# Patient Record
Sex: FEMALE
[De-identification: names, ages, dates, MRNs, and addresses within clinical notes are randomized per-mention and may not be internally consistent; named-entity substitution may affect disease eponyms.]

---

## 2024-07-29 PROBLEM — Z00.00 ENCOUNTER FOR PREVENTIVE HEALTH EXAMINATION: Status: ACTIVE | Noted: 2024-07-29

## 2024-07-30 ENCOUNTER — APPOINTMENT (OUTPATIENT)
Dept: PULMONOLOGY | Facility: CLINIC | Age: 84
End: 2024-07-30
Payer: MEDICARE

## 2024-07-30 VITALS
WEIGHT: 167 LBS | SYSTOLIC BLOOD PRESSURE: 110 MMHG | HEART RATE: 89 BPM | DIASTOLIC BLOOD PRESSURE: 50 MMHG | BODY MASS INDEX: 25.91 KG/M2 | OXYGEN SATURATION: 97 % | HEIGHT: 67.5 IN

## 2024-07-30 DIAGNOSIS — J44.9 CHRONIC OBSTRUCTIVE PULMONARY DISEASE, UNSPECIFIED: ICD-10-CM

## 2024-07-30 PROCEDURE — 99204 OFFICE O/P NEW MOD 45 MIN: CPT

## 2024-07-30 RX ORDER — PANTOPRAZOLE 40 MG/1
40 TABLET, DELAYED RELEASE ORAL DAILY
Refills: 0 | Status: ACTIVE | COMMUNITY

## 2024-07-30 RX ORDER — METOLAZONE 5 MG/1
5 TABLET ORAL DAILY
Refills: 0 | Status: ACTIVE | COMMUNITY

## 2024-07-30 RX ORDER — CHLORHEXIDINE GLUCONATE 4 %
325 (65 FE) LIQUID (ML) TOPICAL DAILY
Refills: 0 | Status: ACTIVE | COMMUNITY

## 2024-07-30 RX ORDER — ACETAMINOPHEN/DIPHENHYDRAMINE 500MG-25MG
1000 TABLET ORAL DAILY
Refills: 0 | Status: ACTIVE | COMMUNITY

## 2024-07-30 RX ORDER — MULTIVIT-MIN/FOLIC/VIT K/LYCOP 400-300MCG
25 MCG TABLET ORAL DAILY
Refills: 0 | Status: ACTIVE | COMMUNITY

## 2024-07-30 RX ORDER — POTASSIUM CHLORIDE 600 MG/1
8 CAPSULE, EXTENDED RELEASE ORAL DAILY
Refills: 0 | Status: ACTIVE | COMMUNITY

## 2024-07-30 RX ORDER — BUMETANIDE 1 MG/1
1 TABLET ORAL
Refills: 0 | Status: ACTIVE | COMMUNITY

## 2024-07-30 RX ORDER — CLOPIDOGREL BISULFATE 75 MG/1
75 TABLET, FILM COATED ORAL DAILY
Refills: 0 | Status: ACTIVE | COMMUNITY

## 2024-07-30 RX ORDER — ROSUVASTATIN CALCIUM 20 MG/1
20 TABLET, FILM COATED ORAL DAILY
Refills: 0 | Status: ACTIVE | COMMUNITY

## 2024-07-30 RX ORDER — FOLIC ACID 1 MG/1
1 TABLET ORAL DAILY
Refills: 0 | Status: ACTIVE | COMMUNITY

## 2024-07-30 RX ORDER — DAPAGLIFLOZIN 10 MG/1
10 TABLET, FILM COATED ORAL DAILY
Refills: 0 | Status: ACTIVE | COMMUNITY

## 2024-07-30 NOTE — HISTORY OF PRESENT ILLNESS
[TextBox_4] : This is an 84-year-old female a [place] sister who is a former 1 pack/day smoker but quit in 2000.  She was told of COPD in 2016.  She denies cough or wheezing.  She does get short of breath walking up 1 flight of stairs but is able to walk slowly on level ground okay.  She walks with a walker due to balance issues.  She is not on any inhalers at this time.  She once was given an inhaler but states it never helped.  Her past history significant for CHF and atrial fibrillation.  Her medications were reviewed.  Patient recently came to [place] from [place].  She never used home oxygen.  Current O2 sat 92 on room air at rest.

## 2024-07-30 NOTE — PHYSICAL EXAM
[No Acute Distress] : no acute distress [Normal Oropharynx] : normal oropharynx [Normal Appearance] : normal appearance [No Neck Mass] : no neck mass [Normal S1, S2] : normal s1, s2 [Irregular rate/rhythm] : irregular rate/rhythm [No Murmurs] : no murmurs [No Resp Distress] : no resp distress [No Abnormalities] : no abnormalities [Benign] : benign [Normal Gait] : normal gait [No Clubbing] : no clubbing [No Cyanosis] : no cyanosis [FROM] : FROM [1+ Pitting] : 1+ pitting [Normal Color/ Pigmentation] : normal color/ pigmentation [No Focal Deficits] : no focal deficits [Oriented x3] : oriented x3 [Normal Affect] : normal affect [TextBox_68] : decreased bs's

## 2024-07-30 NOTE — ASSESSMENT
[FreeTextEntry1] : I believe this patient has significant COPD.  This is based on the clinical exam with diminished breath sounds and an O2 sat of 92 on room air at rest.  Patient will be referred for complete PFT testing.  After PFT testing I will decide upon the use of bronchodilators.  Patient is to return and see me in about 1 month.

## 2024-08-15 ENCOUNTER — APPOINTMENT (OUTPATIENT)
Dept: PULMONOLOGY | Facility: CLINIC | Age: 84
End: 2024-08-15
Payer: MEDICARE

## 2024-08-15 PROCEDURE — 94729 DIFFUSING CAPACITY: CPT

## 2024-08-15 PROCEDURE — 94727 GAS DIL/WSHOT DETER LNG VOL: CPT

## 2024-08-15 PROCEDURE — 94060 EVALUATION OF WHEEZING: CPT

## 2025-05-19 ENCOUNTER — RESULT REVIEW (OUTPATIENT)
Age: 85
End: 2025-05-19

## 2025-05-19 ENCOUNTER — APPOINTMENT (OUTPATIENT)
Dept: HEMATOLOGY ONCOLOGY | Facility: CLINIC | Age: 85
End: 2025-05-19
Payer: MEDICARE

## 2025-05-19 VITALS
WEIGHT: 165 LBS | OXYGEN SATURATION: 90 % | RESPIRATION RATE: 16 BRPM | TEMPERATURE: 97.9 F | HEART RATE: 71 BPM | BODY MASS INDEX: 26.52 KG/M2 | HEIGHT: 66 IN

## 2025-05-19 DIAGNOSIS — D50.0 IRON DEFICIENCY ANEMIA SECONDARY TO BLOOD LOSS (CHRONIC): ICD-10-CM

## 2025-05-19 PROCEDURE — 99205 OFFICE O/P NEW HI 60 MIN: CPT

## 2025-05-19 PROCEDURE — G2211 COMPLEX E/M VISIT ADD ON: CPT

## 2025-05-30 ENCOUNTER — APPOINTMENT (OUTPATIENT)
Dept: GASTROENTEROLOGY | Facility: CLINIC | Age: 85
End: 2025-05-30

## 2025-06-23 ENCOUNTER — RESULT REVIEW (OUTPATIENT)
Age: 85
End: 2025-06-23

## 2025-06-23 ENCOUNTER — APPOINTMENT (OUTPATIENT)
Dept: HEMATOLOGY ONCOLOGY | Facility: CLINIC | Age: 85
End: 2025-06-23
Payer: MEDICARE

## 2025-06-23 VITALS
TEMPERATURE: 97.5 F | BODY MASS INDEX: 27.48 KG/M2 | WEIGHT: 171 LBS | RESPIRATION RATE: 17 BRPM | DIASTOLIC BLOOD PRESSURE: 49 MMHG | SYSTOLIC BLOOD PRESSURE: 96 MMHG | HEIGHT: 66 IN | OXYGEN SATURATION: 89 % | HEART RATE: 61 BPM

## 2025-06-23 PROCEDURE — 99214 OFFICE O/P EST MOD 30 MIN: CPT
